# Patient Record
Sex: MALE | Race: WHITE | Employment: OTHER | URBAN - NONMETROPOLITAN AREA
[De-identification: names, ages, dates, MRNs, and addresses within clinical notes are randomized per-mention and may not be internally consistent; named-entity substitution may affect disease eponyms.]

---

## 2017-09-06 ENCOUNTER — OFFICE VISIT (OUTPATIENT)
Dept: INTERNAL MEDICINE | Age: 67
End: 2017-09-06
Payer: COMMERCIAL

## 2017-09-06 VITALS
DIASTOLIC BLOOD PRESSURE: 56 MMHG | HEART RATE: 68 BPM | HEIGHT: 66 IN | SYSTOLIC BLOOD PRESSURE: 112 MMHG | BODY MASS INDEX: 28.45 KG/M2 | WEIGHT: 177 LBS

## 2017-09-06 DIAGNOSIS — J30.2 SEASONAL ALLERGIC RHINITIS, UNSPECIFIED ALLERGIC RHINITIS TRIGGER: ICD-10-CM

## 2017-09-06 DIAGNOSIS — M19.011 PRIMARY OSTEOARTHRITIS OF RIGHT SHOULDER: ICD-10-CM

## 2017-09-06 DIAGNOSIS — E78.9 LIPID DISORDER: Primary | ICD-10-CM

## 2017-09-06 DIAGNOSIS — R73.01 IMPAIRED FASTING GLUCOSE: ICD-10-CM

## 2017-09-06 PROCEDURE — 99214 OFFICE O/P EST MOD 30 MIN: CPT | Performed by: INTERNAL MEDICINE

## 2017-09-06 PROCEDURE — 90662 IIV NO PRSV INCREASED AG IM: CPT | Performed by: INTERNAL MEDICINE

## 2017-09-06 PROCEDURE — 90471 IMMUNIZATION ADMIN: CPT | Performed by: INTERNAL MEDICINE

## 2017-09-06 ASSESSMENT — PATIENT HEALTH QUESTIONNAIRE - PHQ9
SUM OF ALL RESPONSES TO PHQ QUESTIONS 1-9: 0
1. LITTLE INTEREST OR PLEASURE IN DOING THINGS: 0
SUM OF ALL RESPONSES TO PHQ9 QUESTIONS 1 & 2: 0
2. FEELING DOWN, DEPRESSED OR HOPELESS: 0

## 2018-01-22 ENCOUNTER — HOSPITAL ENCOUNTER (OUTPATIENT)
Age: 68
Setting detail: SPECIMEN
Discharge: HOME | End: 2018-01-22
Payer: COMMERCIAL

## 2018-01-22 ENCOUNTER — OFFICE VISIT (OUTPATIENT)
Dept: INTERNAL MEDICINE | Age: 68
End: 2018-01-22
Payer: COMMERCIAL

## 2018-01-22 VITALS
HEIGHT: 66 IN | BODY MASS INDEX: 28.25 KG/M2 | SYSTOLIC BLOOD PRESSURE: 120 MMHG | RESPIRATION RATE: 16 BRPM | DIASTOLIC BLOOD PRESSURE: 68 MMHG | HEART RATE: 66 BPM | WEIGHT: 175.8 LBS | OXYGEN SATURATION: 94 % | TEMPERATURE: 99.3 F

## 2018-01-22 DIAGNOSIS — R05.9 COUGH: ICD-10-CM

## 2018-01-22 DIAGNOSIS — R00.2 PALPITATION: ICD-10-CM

## 2018-01-22 DIAGNOSIS — J06.9 VIRAL UPPER RESPIRATORY INFECTION: Primary | ICD-10-CM

## 2018-01-22 LAB
DIRECT EXAM: NORMAL
Lab: NORMAL
SPECIMEN DESCRIPTION: NORMAL
STATUS: NORMAL

## 2018-01-22 PROCEDURE — 99213 OFFICE O/P EST LOW 20 MIN: CPT | Performed by: NURSE PRACTITIONER

## 2018-01-22 PROCEDURE — 87804 INFLUENZA ASSAY W/OPTIC: CPT

## 2018-01-22 ASSESSMENT — ENCOUNTER SYMPTOMS
SPUTUM PRODUCTION: 0
BLURRED VISION: 0
COUGH: 0
HEMOPTYSIS: 0
CONSTIPATION: 0
BLOOD IN STOOL: 0
WHEEZING: 0
NAUSEA: 0
STRIDOR: 0
SINUS PAIN: 0
VOMITING: 0
EYE REDNESS: 0
DIARRHEA: 0
SORE THROAT: 0
ORTHOPNEA: 0
SHORTNESS OF BREATH: 0
EYE PAIN: 0
EYE DISCHARGE: 0
HEARTBURN: 0
DOUBLE VISION: 0
ABDOMINAL PAIN: 0

## 2018-01-22 NOTE — PROGRESS NOTES
Monitor 48 Hour; Future  Patient was instructed if develops heart palpitations that last more than a few minutes he was to come in for an evaluation. Informed him of the risk of rapid heart rate. Also instructed to stop caffeine and further stimulants. 3. Cough  - Rapid Influenza A/B Antigens - negative. Supportive care. Plan:  As noted above. Follow up for routine visit. Call sooner with concerns prior.     Electronically signed by Nikita Watkins CNP on 1/22/2018 at 4:06 PM

## 2018-01-23 LAB
CHOLESTEROL, TOTAL: 178 MG/DL
CHOLESTEROL/HDL RATIO: 4.7
HDLC SERPL-MCNC: 38 MG/DL (ref 35–70)
LDL CHOLESTEROL CALCULATED: 127 MG/DL (ref 0–160)
TRIGL SERPL-MCNC: 64 MG/DL
VLDLC SERPL CALC-MCNC: 13 MG/DL

## 2018-02-02 ENCOUNTER — TELEPHONE (OUTPATIENT)
Dept: INTERNAL MEDICINE | Age: 68
End: 2018-02-02

## 2018-02-06 ENCOUNTER — OFFICE VISIT (OUTPATIENT)
Dept: INTERNAL MEDICINE | Age: 68
End: 2018-02-06
Payer: COMMERCIAL

## 2018-02-06 VITALS
HEIGHT: 66 IN | SYSTOLIC BLOOD PRESSURE: 102 MMHG | BODY MASS INDEX: 28.42 KG/M2 | RESPIRATION RATE: 16 BRPM | DIASTOLIC BLOOD PRESSURE: 56 MMHG | WEIGHT: 176.8 LBS | HEART RATE: 62 BPM

## 2018-02-06 DIAGNOSIS — I48.0 PAROXYSMAL ATRIAL FIBRILLATION (HCC): Primary | ICD-10-CM

## 2018-02-06 DIAGNOSIS — R73.01 IMPAIRED FASTING GLUCOSE: ICD-10-CM

## 2018-02-06 DIAGNOSIS — E78.9 LIPID DISORDER: ICD-10-CM

## 2018-02-06 PROCEDURE — 99214 OFFICE O/P EST MOD 30 MIN: CPT | Performed by: INTERNAL MEDICINE

## 2018-02-06 PROCEDURE — 93000 ELECTROCARDIOGRAM COMPLETE: CPT | Performed by: INTERNAL MEDICINE

## 2018-02-06 RX ORDER — DILTIAZEM HYDROCHLORIDE 120 MG/1
120 CAPSULE, COATED, EXTENDED RELEASE ORAL DAILY
Qty: 30 CAPSULE | Refills: 3 | Status: SHIPPED | OUTPATIENT
Start: 2018-02-06 | End: 2018-03-06

## 2018-02-07 NOTE — PROGRESS NOTES
chest discomfort. Gastrointestinal:  Negative for abdominal pain, blood in stool, constipation, diarrhea, nausea and vomiting. Genitourinary:  Negative for dysuria, frequency, and hematuria. Musculoskeletal:  Negative for myalgias. Skin:  Negative for rash. Neurological:  Negative for tingling, sensory change, speech change, focal weakness, seizures, and headaches. Endo/Heme/Allergies:  Does not bruise/bleed easily. Psychiatric/Behavioral:  Negative for hallucinations and suicidal ideas. Patient Active Problem List   Diagnosis    Impaired fasting glucose    Lipid disorder    Osteoarthritis of right shoulder    Seasonal allergic rhinitis    Paroxysmal atrial fibrillation (HCC)       Medications:    Current Outpatient Prescriptions   Medication Sig Dispense Refill    diltiazem (CARDIZEM CD) 120 MG extended release capsule Take 1 capsule by mouth daily 30 capsule 3    aspirin EC 81 MG EC tablet Take 81 mg by mouth daily. No current facility-administered medications for this visit. Past Medical History:        Diagnosis Date    Erectile dysfunction     Viagra, intolerant    Headache syndrome, complicated     clearly consistent with migraine headaches in the past, more recently with tension type headaches.  Hemorrhoids     Impaired fasting glucose 09/07    4.1) Two hour postprandial glucose 84    Lipid disorder     8.5% 10 yr risk- calc 6/1/15    Osteoarthritis of right shoulder 7/10/2015    Paroxysmal atrial fibrillation ([facility] [address] 5/0/5172    Umbilical hernia        Family Hx and Social Hx    family history includes Asthma in his father; Other in his mother; Pacemaker in his mother. History   Smoking Status    Never Smoker   Smokeless Tobacco    Never Used     History   Alcohol Use    0.0 oz/week     Comment: Infrequent alcohol, perhaps 2 or 3 drinks a year       Physical Examination:  Constitutional:  He appears well-developed and well-nourished. No distress.   HENT:  Head: Normocephalic and atraumatic. Right Ear:  External ear normal.  Left Ear:  External ear normal.  Nose:  Nose normal.  Mouth/Throat:  Oropharynx is clear and moist.  Eyes: Conjunctivae and EOM are normal.  Pupils are equal, round, and reactive to light. Right eye exhibits no discharge. Left eye exhibits no discharge. No scleral icterus. Neck:  Normal range of motion. Neck supple. No JVD present. No tracheal deviation present. No thyromegaly present. Cardiovascular:  Normal rate, normal heart sounds, and intact distal pulses. Exam reveals no gallop. Pulmonary/Chest:  Effort normal and breath sounds normal.  No respiratory distress. He has no wheezes. He has no rales. Abdominal:  Soft. Normal aorta and bowel sounds are normal.  He exhibits no distension and no mass. There is no hepatosplenomegaly. There is no tenderness. There is no rebound and no guarding. Musculoskeletal:  Normal range of motion. He exhibits no edema or tenderness. Lymphadenopathy:    He has no cervical adenopathy. Neurological:  He is alert. He has normal strength. No cranial nerve deficit or sensory deficit. He exhibits normal muscle tone. Skin:  Skin is warm and dry. No rash noted. He is not diaphoretic. No pallor. Psychiatric:  He has a normal mood and affect. His behavior is normal.  Judgment normal.      Vitals:    02/06/18 1520   BP: (!) 102/56   Site: Right Arm   Position: Sitting   Cuff Size: Medium Adult   Pulse: 62   Resp: 16   Weight: 176 lb 12.8 oz (80.2 kg)   Height: 5' 6\" (1.676 m)     Body mass index is 28.54 kg/m².     Wt Readings from Last 3 Encounters:   02/06/18 176 lb 12.8 oz (80.2 kg)   01/22/18 175 lb 12.8 oz (79.7 kg)   09/06/17 177 lb (80.3 kg)     BP Readings from Last 3 Encounters:   02/06/18 (!) 102/56   01/22/18 120/68   09/06/17 (!) 112/56         No results found for: K, CREATININE, AST, ALT, TSH, HCT, LABA1C, MICROALBUR, PSA, GLUCOSE, MG, VITD25, FERRITIN, IRON, TIBC  Lab Results   Component

## 2018-03-06 ENCOUNTER — OFFICE VISIT (OUTPATIENT)
Dept: INTERNAL MEDICINE | Age: 68
End: 2018-03-06
Payer: COMMERCIAL

## 2018-03-06 VITALS
WEIGHT: 169 LBS | SYSTOLIC BLOOD PRESSURE: 90 MMHG | HEART RATE: 60 BPM | HEIGHT: 66 IN | BODY MASS INDEX: 27.16 KG/M2 | DIASTOLIC BLOOD PRESSURE: 44 MMHG

## 2018-03-06 DIAGNOSIS — T30.0 BURN: ICD-10-CM

## 2018-03-06 DIAGNOSIS — I48.0 PAROXYSMAL ATRIAL FIBRILLATION (HCC): Primary | ICD-10-CM

## 2018-03-06 DIAGNOSIS — I34.0 NON-RHEUMATIC MITRAL REGURGITATION: ICD-10-CM

## 2018-03-06 DIAGNOSIS — E78.9 LIPID DISORDER: ICD-10-CM

## 2018-03-06 DIAGNOSIS — R73.01 IMPAIRED FASTING GLUCOSE: ICD-10-CM

## 2018-03-06 PROCEDURE — 99214 OFFICE O/P EST MOD 30 MIN: CPT | Performed by: INTERNAL MEDICINE

## 2018-03-06 RX ORDER — APIXABAN 5 MG/1
5 TABLET, FILM COATED ORAL 2 TIMES DAILY
Refills: 1 | COMMUNITY
Start: 2018-02-10 | End: 2019-02-25 | Stop reason: SDUPTHER

## 2018-03-06 RX ORDER — FLECAINIDE ACETATE 100 MG/1
50 TABLET ORAL 2 TIMES DAILY
COMMUNITY
Start: 2018-02-07 | End: 2018-08-24

## 2018-03-06 NOTE — PROGRESS NOTES
Visit Information    Have you changed or started any medications since your last visit including any over-the-counter medicines, vitamins, or herbal medicines? no   Are you having any side effects from any of your medications? -  no  Have you stopped taking any of your medications? Is so, why? -  no    Have you seen any other physician or provider since your last visit? Yes - Records Obtained  Have you had any other diagnostic tests since your last visit? Yes - Records Obtained  Have you been seen in the emergency room and/or had an admission to a hospital since we last saw you? No  Have you had your routine dental cleaning in the past 6 months? yes -     Have you activated your [employer] account? If not, what are your barriers?  Yes     Patient Care Team:  Rickie Oconnell MD as PCP - General (Internal Medicine)    Medical History Review  Past Medical, Family, and Social History reviewed and does contribute to the patient presenting condition    Health Maintenance   Topic Date Due    A1C test (Diabetic or Prediabetic)  12/04/1961    Shingles Vaccine (1 of 2 - 2 Dose Series) 12/04/2001    Pneumococcal low/med risk (1 of 2 - PCV13) 12/04/2016    DTaP/Tdap/Td vaccine (2 - Tdap) 02/28/2017    Colon cancer screen colonoscopy  07/01/2018    Lipid screen  01/23/2023    Flu vaccine  Completed    Hepatitis C screen  Completed

## 2018-03-08 ENCOUNTER — TELEPHONE (OUTPATIENT)
Dept: INTERNAL MEDICINE | Age: 68
End: 2018-03-08

## 2018-03-08 DIAGNOSIS — I48.0 PAROXYSMAL ATRIAL FIBRILLATION (HCC): Primary | ICD-10-CM

## 2018-03-08 NOTE — TELEPHONE ENCOUNTER
Spoke with Dr Neeraj Toure. He advised pt to hold flecanide 48 hrs before stress test.  If test neg, he will decide between a decreased dose of flecanide 50 BID, or pill in pocket, or changing to small dose metoprolol. Also considering Norpace.   He feels he likely has lone afib, and he may stop eliquis at some point with chads vasc2 of 1 Kip: patient in cdu for PT evaluation and social work evaluation for rehab and placement. PT evaluated patient and recommend rehab.

## 2018-03-08 NOTE — TELEPHONE ENCOUNTER
Spoke with patient he is going to have his stress test done at [facility]. He has not called to schedule it yet. He plans to call at lunch time. He will let us know the date when he schedules it. He will call back if he needs help getting test set up.

## 2018-03-20 ENCOUNTER — TELEPHONE (OUTPATIENT)
Dept: INTERNAL MEDICINE | Age: 68
End: 2018-03-20

## 2018-03-20 NOTE — TELEPHONE ENCOUNTER
FYI- patient went into AFib this morning and went into [facility]. While in the ER he went back into sinus rhythm. They are getting ready to discharge patient now.

## 2018-03-20 NOTE — TELEPHONE ENCOUNTER
Spoke with patient- he states that Dr Gracy Arambula did not make any changes to his medications.  He is to stop the flecainide 48 hours before the stress test. He is calling today to schedule the stress test.

## 2018-04-06 ENCOUNTER — TELEPHONE (OUTPATIENT)
Dept: INTERNAL MEDICINE | Age: 68
End: 2018-04-06

## 2018-05-18 ENCOUNTER — OFFICE VISIT (OUTPATIENT)
Dept: INTERNAL MEDICINE | Age: 68
End: 2018-05-18
Payer: COMMERCIAL

## 2018-05-18 VITALS
HEART RATE: 56 BPM | HEIGHT: 66 IN | SYSTOLIC BLOOD PRESSURE: 110 MMHG | DIASTOLIC BLOOD PRESSURE: 62 MMHG | WEIGHT: 161.4 LBS | BODY MASS INDEX: 25.94 KG/M2

## 2018-05-18 DIAGNOSIS — I34.0 NON-RHEUMATIC MITRAL REGURGITATION: ICD-10-CM

## 2018-05-18 DIAGNOSIS — E78.9 LIPID DISORDER: ICD-10-CM

## 2018-05-18 DIAGNOSIS — I48.0 PAROXYSMAL ATRIAL FIBRILLATION (HCC): Primary | ICD-10-CM

## 2018-05-18 DIAGNOSIS — R73.01 IMPAIRED FASTING GLUCOSE: ICD-10-CM

## 2018-05-18 DIAGNOSIS — R21 RASH: ICD-10-CM

## 2018-05-18 PROCEDURE — 99214 OFFICE O/P EST MOD 30 MIN: CPT | Performed by: INTERNAL MEDICINE

## 2018-08-24 ENCOUNTER — OFFICE VISIT (OUTPATIENT)
Dept: INTERNAL MEDICINE | Age: 68
End: 2018-08-24
Payer: COMMERCIAL

## 2018-08-24 VITALS
HEIGHT: 66 IN | WEIGHT: 143 LBS | DIASTOLIC BLOOD PRESSURE: 60 MMHG | HEART RATE: 48 BPM | BODY MASS INDEX: 22.98 KG/M2 | SYSTOLIC BLOOD PRESSURE: 102 MMHG

## 2018-08-24 DIAGNOSIS — I45.10 RBBB: ICD-10-CM

## 2018-08-24 DIAGNOSIS — E78.9 LIPID DISORDER: ICD-10-CM

## 2018-08-24 DIAGNOSIS — R73.01 IMPAIRED FASTING GLUCOSE: ICD-10-CM

## 2018-08-24 DIAGNOSIS — I34.0 NON-RHEUMATIC MITRAL REGURGITATION: ICD-10-CM

## 2018-08-24 DIAGNOSIS — R00.1 BRADYCARDIA: ICD-10-CM

## 2018-08-24 DIAGNOSIS — I48.0 PAROXYSMAL ATRIAL FIBRILLATION (HCC): Primary | ICD-10-CM

## 2018-08-24 PROCEDURE — 99214 OFFICE O/P EST MOD 30 MIN: CPT | Performed by: INTERNAL MEDICINE

## 2018-08-24 PROCEDURE — 93000 ELECTROCARDIOGRAM COMPLETE: CPT | Performed by: INTERNAL MEDICINE

## 2018-08-27 LAB
AVERAGE GLUCOSE: NORMAL
HBA1C MFR BLD: 5.6 %

## 2018-08-28 DIAGNOSIS — D64.9 ANEMIA, UNSPECIFIED TYPE: Primary | ICD-10-CM

## 2018-08-29 NOTE — PROGRESS NOTES
4.1) Two hour postprandial glucose 84    Lipid disorder     8.5% 10 yr risk- calc 6/1/15    Non-rheumatic mitral regurgitation 3/6/2018    1+ on echo 1+ AR 2/17, Nondiagnostic exercise stress test 2/17 in [place]    Osteoarthritis of right shoulder 7/10/2015    Paroxysmal atrial fibrillation ([facility] [address] 02/06/2018    Stress test 4/18 [address], Echo 1+ MR and AI 2/98    Umbilical hernia        Family Hx and Social Hx    family history includes Asthma in his father; Other in his mother; Pacemaker in his mother. History   Smoking Status    Never Smoker   Smokeless Tobacco    Never Used     History   Alcohol Use    0.0 oz/week     Comment: Infrequent alcohol, perhaps 2 or 3 drinks a year       Vitals:    08/24/18 1520   BP: 102/60   Site: Left Arm   Position: Sitting   Cuff Size: Small Adult   Pulse: (!) 48   Weight: 143 lb (64.9 kg)   Height: 5' 6\" (1.676 m)        Vitals Reviewed. Body mass index is 23.08 kg/m². Wt Readings from Last 3 Encounters:   08/24/18 143 lb (64.9 kg)   05/18/18 161 lb 6.4 oz (73.2 kg)   03/06/18 169 lb (76.7 kg)     BP Readings from Last 3 Encounters:   08/24/18 102/60   05/18/18 110/62   03/06/18 (!) 90/44       Physical Examination:  Constitutional:  He appears well-developed and well-nourished. No distress. HENT:  Head: Normocephalic and atraumatic. Right Ear:  External ear normal.  Left Ear:  External ear normal.  Nose:  Nose normal.  Mouth/Throat:  Oropharynx is clear and moist.  Eyes: Conjunctivae and EOM are normal.  Pupils are equal, round, and reactive to light. Right eye exhibits no discharge. Left eye exhibits no discharge. No scleral icterus. Neck:  Normal range of motion. Neck supple. No JVD present. No tracheal deviation present. No thyromegaly present. Cardiovascular:  Bradycardic. Regular rhythm. Normal heart sounds, and intact distal pulses. Exam reveals no gallop. No carotid bruit.   Pulmonary/Chest:  Effort normal and breath sounds stopping this, in fact, favored that. He is going to meet with the EP specialist on Wednesday; Dr. Juliet Cervanteso had already set that up. If he is having problems he is going to need to come into the ER. He remains on the Eliquis with the atrial fibrillation. He is not on any other rate controlling agents. - EKG 12 Lead  - Comprehensive Metabolic Panel; Future  - CBC Auto Differential; Future    4. Impaired fasting glucose  Work on diet. Continue to monitor.   - Hemoglobin A1C; Future    5. Lipid disorder  He is opposed to a cholesterol lowering agent at this point but is working aggressively on diet, as noted. 6. Non-rheumatic mitral regurgitation  Stable. Continue to monitor. Monitor. 7. Labs pending with a Chem-12, CBC and HA1c.    8.  He is going to get the Prevnar-13 in the next few days up in [place]. 9.  He wants to wait on colonoscopy but might potentially get this addressed hopefully later this year or early next year. He will call if any problems prior to return. No orders of the defined types were placed in this encounter.     Jesús Washington am scribing for Ruth Suggs MD 8/29/2018 at 7:28 AM.

## 2018-11-19 ENCOUNTER — OFFICE VISIT (OUTPATIENT)
Dept: INTERNAL MEDICINE | Age: 68
End: 2018-11-19
Payer: COMMERCIAL

## 2018-11-19 VITALS
HEIGHT: 66 IN | WEIGHT: 145 LBS | DIASTOLIC BLOOD PRESSURE: 54 MMHG | BODY MASS INDEX: 23.3 KG/M2 | HEART RATE: 53 BPM | SYSTOLIC BLOOD PRESSURE: 95 MMHG

## 2018-11-19 DIAGNOSIS — D64.9 ANEMIA, UNSPECIFIED TYPE: ICD-10-CM

## 2018-11-19 DIAGNOSIS — E78.9 LIPID DISORDER: ICD-10-CM

## 2018-11-19 DIAGNOSIS — R73.01 IMPAIRED FASTING GLUCOSE: ICD-10-CM

## 2018-11-19 DIAGNOSIS — I48.0 PAROXYSMAL ATRIAL FIBRILLATION (HCC): Primary | ICD-10-CM

## 2018-11-19 DIAGNOSIS — I34.0 NON-RHEUMATIC MITRAL REGURGITATION: ICD-10-CM

## 2018-11-19 PROCEDURE — 93000 ELECTROCARDIOGRAM COMPLETE: CPT | Performed by: INTERNAL MEDICINE

## 2018-11-19 PROCEDURE — 99214 OFFICE O/P EST MOD 30 MIN: CPT | Performed by: INTERNAL MEDICINE

## 2018-11-19 ASSESSMENT — PATIENT HEALTH QUESTIONNAIRE - PHQ9
1. LITTLE INTEREST OR PLEASURE IN DOING THINGS: 0
SUM OF ALL RESPONSES TO PHQ9 QUESTIONS 1 & 2: 0
SUM OF ALL RESPONSES TO PHQ QUESTIONS 1-9: 0
2. FEELING DOWN, DEPRESSED OR HOPELESS: 0
SUM OF ALL RESPONSES TO PHQ QUESTIONS 1-9: 0

## 2018-11-23 NOTE — PROGRESS NOTES
Frankie Gray  YOB: 1950    Date of Service:  11/19/2018      HPI:  Messi Mills was seen in the internal medicine office today for:  Chief Complaint  Patient presents with  · Atrial fibrillation. · Palpitations. · Blood sugar problem. · Cholesterol problem. · Anemia. · Mitral valve problem. · and continued evaluation and management of chronic medical problems. We addressed the following new, acute or uncontrolled/unstable issues:    1. The atrial fibrillation is the biggest issue. When we saw him last time we went ahead and stopped the flecainide. He is now on just the Eliquis and he really does not take any other prescription medications. He did meet with the EP specialist, Dr. Veronica Salguero. He offered him cryoablation with pulmonary vein isolation procedure. Maggie Harsha wanted to think about that at that time and we reviewed that record. He had a period of time, probably about 8 weeks, where he did pretty good but in the last few days he has had more palpitations. It will be a racing sensation. He is not really sure about how regular or irregular it is but he thinks it is probably in the 120s or 130s based on his experience with this now. Typically, he might run in the 50s and he is not on any agent to control his rate and that is one of the reasons he is not on anything as he tends to run fairly slow when he is in sinus. He has not been having chest pain, dyspnea or dizziness. Blood pressure was a little low when the nurse checked it but when I checked it, it was in the mid 90s which is pretty typical for him. He has not had any signs or symptoms of stroke. He has not had bleeding problems. The palpitations have been occurring more frequently in the past few days. This morning it lasted about 20 minutes and was fairly significant he felt.       We addressed the following chronic issues:    · Impaired fasting glucose  - We discussed diet and activity level as it relates to blood sugars

## 2019-02-25 ENCOUNTER — OFFICE VISIT (OUTPATIENT)
Dept: INTERNAL MEDICINE | Age: 69
End: 2019-02-25
Payer: COMMERCIAL

## 2019-02-25 VITALS
DIASTOLIC BLOOD PRESSURE: 54 MMHG | WEIGHT: 146 LBS | SYSTOLIC BLOOD PRESSURE: 92 MMHG | HEIGHT: 66 IN | BODY MASS INDEX: 23.46 KG/M2 | HEART RATE: 56 BPM

## 2019-02-25 DIAGNOSIS — R73.01 IMPAIRED FASTING GLUCOSE: ICD-10-CM

## 2019-02-25 DIAGNOSIS — I34.0 NON-RHEUMATIC MITRAL REGURGITATION: ICD-10-CM

## 2019-02-25 DIAGNOSIS — E78.9 LIPID DISORDER: ICD-10-CM

## 2019-02-25 DIAGNOSIS — I48.0 PAROXYSMAL ATRIAL FIBRILLATION (HCC): Primary | ICD-10-CM

## 2019-02-25 DIAGNOSIS — Z12.11 COLON CANCER SCREENING: ICD-10-CM

## 2019-02-25 PROCEDURE — 99213 OFFICE O/P EST LOW 20 MIN: CPT | Performed by: INTERNAL MEDICINE

## 2019-02-25 RX ORDER — APIXABAN 5 MG/1
5 TABLET, FILM COATED ORAL 2 TIMES DAILY
Qty: 180 TABLET | Refills: 3 | Status: SHIPPED | OUTPATIENT
Start: 2019-02-25

## 2019-02-25 ASSESSMENT — PATIENT HEALTH QUESTIONNAIRE - PHQ9
SUM OF ALL RESPONSES TO PHQ9 QUESTIONS 1 & 2: 0
1. LITTLE INTEREST OR PLEASURE IN DOING THINGS: 0
2. FEELING DOWN, DEPRESSED OR HOPELESS: 0
SUM OF ALL RESPONSES TO PHQ QUESTIONS 1-9: 0
SUM OF ALL RESPONSES TO PHQ QUESTIONS 1-9: 0

## 2019-02-27 ENCOUNTER — OFFICE VISIT (OUTPATIENT)
Dept: INTERNAL MEDICINE | Age: 69
End: 2019-02-27
Payer: COMMERCIAL

## 2019-02-27 VITALS
BODY MASS INDEX: 22.98 KG/M2 | DIASTOLIC BLOOD PRESSURE: 56 MMHG | HEIGHT: 66 IN | WEIGHT: 143 LBS | HEART RATE: 52 BPM | SYSTOLIC BLOOD PRESSURE: 106 MMHG

## 2019-02-27 DIAGNOSIS — K40.90 LEFT INGUINAL HERNIA: Primary | ICD-10-CM

## 2019-02-27 DIAGNOSIS — S61.212A LACERATION OF RIGHT MIDDLE FINGER WITHOUT FOREIGN BODY WITHOUT DAMAGE TO NAIL, INITIAL ENCOUNTER: ICD-10-CM

## 2019-02-27 PROCEDURE — 99213 OFFICE O/P EST LOW 20 MIN: CPT | Performed by: INTERNAL MEDICINE

## 2019-02-27 PROCEDURE — 90714 TD VACC NO PRESV 7 YRS+ IM: CPT | Performed by: INTERNAL MEDICINE

## 2019-02-27 PROCEDURE — 90471 IMMUNIZATION ADMIN: CPT | Performed by: INTERNAL MEDICINE

## 2019-05-08 ENCOUNTER — TELEPHONE (OUTPATIENT)
Dept: FAMILY MEDICINE CLINIC | Age: 69
End: 2019-05-08

## 2019-05-08 NOTE — TELEPHONE ENCOUNTER
Faxed a request for the patients COLONOSCOPY results to [employer] [employer] via email at [email or web address].